# Patient Record
Sex: MALE | Race: WHITE | NOT HISPANIC OR LATINO
[De-identification: names, ages, dates, MRNs, and addresses within clinical notes are randomized per-mention and may not be internally consistent; named-entity substitution may affect disease eponyms.]

---

## 2017-01-13 ENCOUNTER — APPOINTMENT (OUTPATIENT)
Dept: CARDIOLOGY | Facility: CLINIC | Age: 82
End: 2017-01-13

## 2017-01-13 ENCOUNTER — NON-APPOINTMENT (OUTPATIENT)
Age: 82
End: 2017-01-13

## 2017-01-13 VITALS
OXYGEN SATURATION: 100 % | SYSTOLIC BLOOD PRESSURE: 128 MMHG | DIASTOLIC BLOOD PRESSURE: 52 MMHG | BODY MASS INDEX: 21.86 KG/M2 | HEART RATE: 62 BPM | WEIGHT: 136 LBS | HEIGHT: 66 IN

## 2017-02-02 ENCOUNTER — RX RENEWAL (OUTPATIENT)
Age: 82
End: 2017-02-02

## 2017-03-03 ENCOUNTER — RX RENEWAL (OUTPATIENT)
Age: 82
End: 2017-03-03

## 2017-03-06 ENCOUNTER — RX RENEWAL (OUTPATIENT)
Age: 82
End: 2017-03-06

## 2017-04-17 ENCOUNTER — RX RENEWAL (OUTPATIENT)
Age: 82
End: 2017-04-17

## 2017-05-12 ENCOUNTER — NON-APPOINTMENT (OUTPATIENT)
Age: 82
End: 2017-05-12

## 2017-05-12 ENCOUNTER — APPOINTMENT (OUTPATIENT)
Dept: CARDIOLOGY | Facility: CLINIC | Age: 82
End: 2017-05-12

## 2017-05-12 VITALS
HEIGHT: 66 IN | DIASTOLIC BLOOD PRESSURE: 56 MMHG | HEART RATE: 54 BPM | BODY MASS INDEX: 21.86 KG/M2 | WEIGHT: 136 LBS | OXYGEN SATURATION: 99 % | SYSTOLIC BLOOD PRESSURE: 158 MMHG

## 2017-05-12 VITALS — SYSTOLIC BLOOD PRESSURE: 140 MMHG | DIASTOLIC BLOOD PRESSURE: 60 MMHG

## 2017-08-04 ENCOUNTER — MEDICATION RENEWAL (OUTPATIENT)
Age: 82
End: 2017-08-04

## 2017-08-04 ENCOUNTER — RX RENEWAL (OUTPATIENT)
Age: 82
End: 2017-08-04

## 2017-09-05 ENCOUNTER — RX RENEWAL (OUTPATIENT)
Age: 82
End: 2017-09-05

## 2017-09-06 ENCOUNTER — MEDICATION RENEWAL (OUTPATIENT)
Age: 82
End: 2017-09-06

## 2017-10-12 ENCOUNTER — MEDICATION RENEWAL (OUTPATIENT)
Age: 82
End: 2017-10-12

## 2017-10-13 ENCOUNTER — APPOINTMENT (OUTPATIENT)
Dept: CARDIOLOGY | Facility: CLINIC | Age: 82
End: 2017-10-13
Payer: MEDICARE

## 2017-10-13 ENCOUNTER — NON-APPOINTMENT (OUTPATIENT)
Age: 82
End: 2017-10-13

## 2017-10-13 VITALS — DIASTOLIC BLOOD PRESSURE: 60 MMHG | SYSTOLIC BLOOD PRESSURE: 160 MMHG

## 2017-10-13 VITALS — SYSTOLIC BLOOD PRESSURE: 167 MMHG | HEART RATE: 51 BPM | OXYGEN SATURATION: 98 % | DIASTOLIC BLOOD PRESSURE: 60 MMHG

## 2017-10-13 VITALS — WEIGHT: 133 LBS | HEIGHT: 66 IN | BODY MASS INDEX: 21.38 KG/M2

## 2017-10-13 PROCEDURE — 93000 ELECTROCARDIOGRAM COMPLETE: CPT

## 2017-10-13 PROCEDURE — 99214 OFFICE O/P EST MOD 30 MIN: CPT | Mod: 25

## 2017-10-31 ENCOUNTER — APPOINTMENT (OUTPATIENT)
Dept: CARDIOLOGY | Facility: CLINIC | Age: 82
End: 2017-10-31
Payer: MEDICARE

## 2017-10-31 PROCEDURE — 93306 TTE W/DOPPLER COMPLETE: CPT

## 2017-11-10 ENCOUNTER — APPOINTMENT (OUTPATIENT)
Dept: CARDIOLOGY | Facility: CLINIC | Age: 82
End: 2017-11-10
Payer: MEDICARE

## 2017-11-10 VITALS
WEIGHT: 136 LBS | BODY MASS INDEX: 21.86 KG/M2 | OXYGEN SATURATION: 99 % | SYSTOLIC BLOOD PRESSURE: 100 MMHG | HEIGHT: 66 IN | DIASTOLIC BLOOD PRESSURE: 59 MMHG | HEART RATE: 60 BPM

## 2017-11-10 PROCEDURE — 99214 OFFICE O/P EST MOD 30 MIN: CPT

## 2017-11-10 PROCEDURE — 36415 COLL VENOUS BLD VENIPUNCTURE: CPT

## 2017-11-13 LAB
ALBUMIN SERPL ELPH-MCNC: 3.8 G/DL
ALP BLD-CCNC: 66 U/L
ALT SERPL-CCNC: 10 U/L
ANION GAP SERPL CALC-SCNC: 12 MMOL/L
AST SERPL-CCNC: 27 U/L
BILIRUB SERPL-MCNC: 0.4 MG/DL
BUN SERPL-MCNC: 28 MG/DL
CALCIUM SERPL-MCNC: 9.3 MG/DL
CHLORIDE SERPL-SCNC: 101 MMOL/L
CHOLEST SERPL-MCNC: 115 MG/DL
CHOLEST/HDLC SERPL: 2.6 RATIO
CO2 SERPL-SCNC: 27 MMOL/L
CREAT SERPL-MCNC: 1.24 MG/DL
GLUCOSE SERPL-MCNC: 187 MG/DL
HDLC SERPL-MCNC: 45 MG/DL
LDLC SERPL CALC-MCNC: 57 MG/DL
POTASSIUM SERPL-SCNC: 5.1 MMOL/L
PROT SERPL-MCNC: 6.8 G/DL
SODIUM SERPL-SCNC: 140 MMOL/L
TRIGL SERPL-MCNC: 63 MG/DL

## 2018-01-29 ENCOUNTER — RX RENEWAL (OUTPATIENT)
Age: 83
End: 2018-01-29

## 2018-03-05 ENCOUNTER — RX RENEWAL (OUTPATIENT)
Age: 83
End: 2018-03-05

## 2018-03-05 ENCOUNTER — MEDICATION RENEWAL (OUTPATIENT)
Age: 83
End: 2018-03-05

## 2018-03-23 ENCOUNTER — NON-APPOINTMENT (OUTPATIENT)
Age: 83
End: 2018-03-23

## 2018-03-23 ENCOUNTER — APPOINTMENT (OUTPATIENT)
Dept: CARDIOLOGY | Facility: CLINIC | Age: 83
End: 2018-03-23
Payer: MEDICARE

## 2018-03-23 VITALS — WEIGHT: 138 LBS | BODY MASS INDEX: 22.18 KG/M2 | HEIGHT: 66 IN

## 2018-03-23 VITALS — SYSTOLIC BLOOD PRESSURE: 137 MMHG | DIASTOLIC BLOOD PRESSURE: 51 MMHG | HEART RATE: 43 BPM | OXYGEN SATURATION: 98 %

## 2018-03-23 DIAGNOSIS — Z00.00 ENCOUNTER FOR GENERAL ADULT MEDICAL EXAMINATION W/OUT ABNORMAL FINDINGS: ICD-10-CM

## 2018-03-23 PROCEDURE — 93000 ELECTROCARDIOGRAM COMPLETE: CPT

## 2018-03-23 PROCEDURE — 99214 OFFICE O/P EST MOD 30 MIN: CPT | Mod: 25

## 2018-04-06 ENCOUNTER — RX RENEWAL (OUTPATIENT)
Age: 83
End: 2018-04-06

## 2018-07-03 ENCOUNTER — RX RENEWAL (OUTPATIENT)
Age: 83
End: 2018-07-03

## 2018-07-20 ENCOUNTER — APPOINTMENT (OUTPATIENT)
Dept: CARDIOLOGY | Facility: CLINIC | Age: 83
End: 2018-07-20
Payer: MEDICARE

## 2018-07-20 ENCOUNTER — NON-APPOINTMENT (OUTPATIENT)
Age: 83
End: 2018-07-20

## 2018-07-20 VITALS — BODY MASS INDEX: 21.21 KG/M2 | HEIGHT: 66 IN | WEIGHT: 132 LBS

## 2018-07-20 VITALS — SYSTOLIC BLOOD PRESSURE: 95 MMHG | HEART RATE: 48 BPM | DIASTOLIC BLOOD PRESSURE: 39 MMHG | OXYGEN SATURATION: 96 %

## 2018-07-20 PROCEDURE — 93000 ELECTROCARDIOGRAM COMPLETE: CPT

## 2018-07-20 PROCEDURE — 99214 OFFICE O/P EST MOD 30 MIN: CPT | Mod: 25

## 2018-07-27 ENCOUNTER — MEDICATION RENEWAL (OUTPATIENT)
Age: 83
End: 2018-07-27

## 2018-07-27 ENCOUNTER — RX RENEWAL (OUTPATIENT)
Age: 83
End: 2018-07-27

## 2018-09-04 ENCOUNTER — RX RENEWAL (OUTPATIENT)
Age: 83
End: 2018-09-04

## 2018-09-14 ENCOUNTER — RX RENEWAL (OUTPATIENT)
Age: 83
End: 2018-09-14

## 2018-10-01 ENCOUNTER — RX RENEWAL (OUTPATIENT)
Age: 83
End: 2018-10-01

## 2018-10-01 ENCOUNTER — MEDICATION RENEWAL (OUTPATIENT)
Age: 83
End: 2018-10-01

## 2018-11-02 ENCOUNTER — APPOINTMENT (OUTPATIENT)
Dept: CARDIOLOGY | Facility: CLINIC | Age: 83
End: 2018-11-02
Payer: MEDICARE

## 2018-11-02 ENCOUNTER — NON-APPOINTMENT (OUTPATIENT)
Age: 83
End: 2018-11-02

## 2018-11-02 VITALS
HEART RATE: 56 BPM | DIASTOLIC BLOOD PRESSURE: 63 MMHG | SYSTOLIC BLOOD PRESSURE: 151 MMHG | HEIGHT: 66 IN | WEIGHT: 137 LBS | RESPIRATION RATE: 18 BRPM | OXYGEN SATURATION: 98 % | BODY MASS INDEX: 22.02 KG/M2

## 2018-11-02 PROCEDURE — 93000 ELECTROCARDIOGRAM COMPLETE: CPT

## 2018-11-02 PROCEDURE — 99214 OFFICE O/P EST MOD 30 MIN: CPT | Mod: 25

## 2018-11-02 NOTE — DISCUSSION/SUMMARY
[Coronary Artery Disease] : coronary artery disease [Hyperlipidemia] : hyperlipidemia [Diet Modification] : diet modification [Exercise] : exercise [Hypertension] : hypertension [Peripheral Vascular Disease] : peripheral vascular disease [Stable] : stable [None] : none

## 2018-11-02 NOTE — REVIEW OF SYSTEMS
[Shortness Of Breath] : no shortness of breath [Abdominal Pain] : no abdominal pain [Nausea] : no nausea [Vomiting] : no vomiting [Heartburn] : no heartburn [Change in Appetite] : no change in appetite [Change In The Stool] : no change in stool [Dysphagia] : no dysphagia [Urinary Frequency] : no change in urinary frequency [see HPI] : see HPI [Joint Pain] : joint pain [Easy Bleeding] : no tendency for easy bleeding [Easy Bruising] : no tendency for easy bruising [Negative] : Endocrine

## 2018-11-02 NOTE — HISTORY OF PRESENT ILLNESS
[FreeTextEntry1] : Patient with CAD.PAD with prior LAD,Ramus  PCI  9/09, LBBB,moderate aortic stenosis came for follow up .Patient denies any symptoms , denies any shortness of breath  or dizziness ,denies any claudication \par \par  patient had blood work with his primary was told to be ok  \par  his bp is elevated as he was not compliant to diet  , denies any dizziness  \par \par Patient  denies any exertional chest pain or claudication.   Patient had carotid doppler showed 50 to79% bilateral stenosis  stable since 2014 ,  his echo showing  moderate aortic stenosis\par \par Lower extremity Doppler showed significant  left anterior tibial , right tibioperoneal stenosis , however patient denies any claudication or foot pain .\par \par his echo showed moderate AS , moderate to severe MR  which is worse than before \par  \par

## 2018-11-02 NOTE — PHYSICAL EXAM
[General Appearance - Well Developed] : well developed [General Appearance - Well Nourished] : well nourished [Normal Conjunctiva] : the conjunctiva exhibited no abnormalities [Normal Oral Mucosa] : normal oral mucosa [] : no respiratory distress [Respiration, Rhythm And Depth] : normal respiratory rhythm and effort [Auscultation Breath Sounds / Voice Sounds] : lungs were clear to auscultation bilaterally [Chest Palpation] : palpation of the chest revealed no abnormalities [Lungs Percussion] : the lungs were normal to percussion [Bowel Sounds] : normal bowel sounds [Abdomen Tenderness] : non-tender [Abdomen Mass (___ Cm)] : no abdominal mass palpated [Abnormal Walk] : normal gait [Nail Clubbing] : no clubbing of the fingernails [Cyanosis, Localized] : no localized cyanosis [FreeTextEntry1] : decreased  pulses in DP,PT  [Skin Color & Pigmentation] : normal skin color and pigmentation [Oriented To Time, Place, And Person] : oriented to person, place, and time [Normal Appearance] : was normal in appearance [Neck Supple] : was supple [Normal] : normal [Normal Rate] : normal [Rhythm Regular] : regular [Normal S1] : normal S1 [Normal S2] : normal S2 [No Gallop] : no gallop heard [III] : a grade 3 [2+] : left 2+ [0] : left 0 [Bruit] : a bruit was heard [No Pitting Edema] : no pitting edema present [Rt] : no varicose veins of the right leg [Lt] : no varicose veins of the left leg

## 2018-11-02 NOTE — CARDIOLOGY SUMMARY
[Fixed Defect] : fixed defect [___] : [unfilled] [LVEF ___%] : LVEF [unfilled]% [Moderate] : moderate mitral regurgitation

## 2018-11-02 NOTE — REASON FOR VISIT
[Follow-Up - Clinic] : a clinic follow-up of [Abnormal ECG] : an abnormal ECG [Coronary Artery Disease] : coronary artery disease [Hyperlipidemia] : hyperlipidemia [Hypertension] : hypertension [Medication Management] : Medication management [Peripheral Vascular Disease] : peripheral vascular disease [FreeTextEntry1] : aortic stenosis

## 2019-01-02 ENCOUNTER — EMERGENCY (EMERGENCY)
Facility: HOSPITAL | Age: 84
LOS: 1 days | Discharge: HOME | End: 2019-01-02
Attending: EMERGENCY MEDICINE | Admitting: EMERGENCY MEDICINE
Payer: MEDICARE

## 2019-01-02 VITALS
SYSTOLIC BLOOD PRESSURE: 145 MMHG | HEART RATE: 61 BPM | HEIGHT: 66 IN | RESPIRATION RATE: 18 BRPM | WEIGHT: 139.99 LBS | TEMPERATURE: 97 F | DIASTOLIC BLOOD PRESSURE: 71 MMHG

## 2019-01-02 LAB
ALBUMIN SERPL ELPH-MCNC: 3.7 G/DL — SIGNIFICANT CHANGE UP (ref 3.3–5)
ALP SERPL-CCNC: 80 U/L — SIGNIFICANT CHANGE UP (ref 40–120)
ALT FLD-CCNC: 31 U/L — SIGNIFICANT CHANGE UP (ref 12–78)
ANION GAP SERPL CALC-SCNC: 6 MMOL/L — SIGNIFICANT CHANGE UP (ref 5–17)
APTT BLD: 30.3 SEC — SIGNIFICANT CHANGE UP (ref 28.5–37)
AST SERPL-CCNC: 31 U/L — SIGNIFICANT CHANGE UP (ref 15–37)
BASOPHILS # BLD AUTO: 0.05 K/UL — SIGNIFICANT CHANGE UP (ref 0–0.2)
BASOPHILS NFR BLD AUTO: 0.5 % — SIGNIFICANT CHANGE UP (ref 0–2)
BILIRUB SERPL-MCNC: 0.3 MG/DL — SIGNIFICANT CHANGE UP (ref 0.2–1.2)
BUN SERPL-MCNC: 35 MG/DL — HIGH (ref 7–23)
CALCIUM SERPL-MCNC: 8.7 MG/DL — SIGNIFICANT CHANGE UP (ref 8.5–10.1)
CHLORIDE SERPL-SCNC: 106 MMOL/L — SIGNIFICANT CHANGE UP (ref 96–108)
CO2 SERPL-SCNC: 28 MMOL/L — SIGNIFICANT CHANGE UP (ref 22–31)
CREAT SERPL-MCNC: 1.2 MG/DL — SIGNIFICANT CHANGE UP (ref 0.5–1.3)
EOSINOPHIL # BLD AUTO: 0.2 K/UL — SIGNIFICANT CHANGE UP (ref 0–0.5)
EOSINOPHIL NFR BLD AUTO: 2 % — SIGNIFICANT CHANGE UP (ref 0–6)
GLUCOSE SERPL-MCNC: 96 MG/DL — SIGNIFICANT CHANGE UP (ref 70–99)
HCT VFR BLD CALC: 37.2 % — LOW (ref 39–50)
HGB BLD-MCNC: 12.2 G/DL — LOW (ref 13–17)
IMM GRANULOCYTES NFR BLD AUTO: 0.5 % — SIGNIFICANT CHANGE UP (ref 0–1.5)
INR BLD: 1.04 RATIO — SIGNIFICANT CHANGE UP (ref 0.88–1.16)
LYMPHOCYTES # BLD AUTO: 1.96 K/UL — SIGNIFICANT CHANGE UP (ref 1–3.3)
LYMPHOCYTES # BLD AUTO: 19.6 % — SIGNIFICANT CHANGE UP (ref 13–44)
MCHC RBC-ENTMCNC: 32 PG — SIGNIFICANT CHANGE UP (ref 27–34)
MCHC RBC-ENTMCNC: 32.8 GM/DL — SIGNIFICANT CHANGE UP (ref 32–36)
MCV RBC AUTO: 97.6 FL — SIGNIFICANT CHANGE UP (ref 80–100)
MONOCYTES # BLD AUTO: 1.19 K/UL — HIGH (ref 0–0.9)
MONOCYTES NFR BLD AUTO: 11.9 % — SIGNIFICANT CHANGE UP (ref 2–14)
NEUTROPHILS # BLD AUTO: 6.54 K/UL — SIGNIFICANT CHANGE UP (ref 1.8–7.4)
NEUTROPHILS NFR BLD AUTO: 65.5 % — SIGNIFICANT CHANGE UP (ref 43–77)
NRBC # BLD: 0 /100 WBCS — SIGNIFICANT CHANGE UP (ref 0–0)
PLATELET # BLD AUTO: 284 K/UL — SIGNIFICANT CHANGE UP (ref 150–400)
POTASSIUM SERPL-MCNC: 4.2 MMOL/L — SIGNIFICANT CHANGE UP (ref 3.5–5.3)
POTASSIUM SERPL-SCNC: 4.2 MMOL/L — SIGNIFICANT CHANGE UP (ref 3.5–5.3)
PROT SERPL-MCNC: 7.7 G/DL — SIGNIFICANT CHANGE UP (ref 6–8.3)
PROTHROM AB SERPL-ACNC: 11.9 SEC — SIGNIFICANT CHANGE UP (ref 10–12.9)
RBC # BLD: 3.81 M/UL — LOW (ref 4.2–5.8)
RBC # FLD: 11.7 % — SIGNIFICANT CHANGE UP (ref 10.3–14.5)
SODIUM SERPL-SCNC: 140 MMOL/L — SIGNIFICANT CHANGE UP (ref 135–145)
WBC # BLD: 9.99 K/UL — SIGNIFICANT CHANGE UP (ref 3.8–10.5)
WBC # FLD AUTO: 9.99 K/UL — SIGNIFICANT CHANGE UP (ref 3.8–10.5)

## 2019-01-02 PROCEDURE — 80053 COMPREHEN METABOLIC PANEL: CPT

## 2019-01-02 PROCEDURE — 36415 COLL VENOUS BLD VENIPUNCTURE: CPT

## 2019-01-02 PROCEDURE — 70450 CT HEAD/BRAIN W/O DYE: CPT | Mod: 26

## 2019-01-02 PROCEDURE — 85610 PROTHROMBIN TIME: CPT

## 2019-01-02 PROCEDURE — 85730 THROMBOPLASTIN TIME PARTIAL: CPT

## 2019-01-02 PROCEDURE — 85027 COMPLETE CBC AUTOMATED: CPT

## 2019-01-02 PROCEDURE — 99284 EMERGENCY DEPT VISIT MOD MDM: CPT | Mod: 25

## 2019-01-02 PROCEDURE — 70450 CT HEAD/BRAIN W/O DYE: CPT

## 2019-01-02 PROCEDURE — 99284 EMERGENCY DEPT VISIT MOD MDM: CPT

## 2019-01-02 RX ORDER — SIMVASTATIN 20 MG/1
0 TABLET, FILM COATED ORAL
Qty: 0 | Refills: 0 | COMMUNITY

## 2019-01-02 RX ORDER — RAMIPRIL 5 MG
0 CAPSULE ORAL
Qty: 0 | Refills: 0 | COMMUNITY

## 2019-01-02 RX ORDER — LEVOTHYROXINE SODIUM 125 MCG
1 TABLET ORAL
Qty: 0 | Refills: 0 | COMMUNITY

## 2019-01-02 RX ORDER — METOPROLOL TARTRATE 50 MG
1 TABLET ORAL
Qty: 0 | Refills: 0 | COMMUNITY

## 2019-01-02 RX ORDER — CLOPIDOGREL BISULFATE 75 MG/1
1 TABLET, FILM COATED ORAL
Qty: 0 | Refills: 0 | COMMUNITY

## 2019-01-02 NOTE — ED ADULT NURSE NOTE - CHPI ED NUR SYMPTOMS NEG
no weakness/no dizziness/no blurred vision/no loss of consciousness/no vomiting/no fever/no nausea/no numbness

## 2019-01-02 NOTE — ED ADULT NURSE NOTE - OBJECTIVE STATEMENT
received pt in bed #14a Pt Alert to name,  & month. Pt w/ hx  of dementia & as per wife pt always confused. As per police wife called because pt went for a walk & she could not get him to come back to the house.

## 2019-01-02 NOTE — ED PROVIDER NOTE - MEDICAL DECISION MAKING DETAILS
pt bib ems for ams, has dementia, left house, [place] [facility] [place]

## 2019-01-02 NOTE — ED PROVIDER NOTE - OBJECTIVE STATEMENT
pt bib ems for change in mental status. pt left house, said he was going to walk to [facility] [place]. per wife, pt usually listens when he asks him to come back inside, but didn't today. no fever, chills, ha, d/n/v, cp, sob, cough, abd pain, diarrhea, trauma, or any other complaints.  pmd - jimmy

## 2019-01-02 NOTE — ED ADULT NURSE NOTE - NSIMPLEMENTINTERV_GEN_ALL_ED
Implemented All Universal Safety Interventions:  [place] to call system. Call bell, personal items and telephone within reach. Instruct patient to call for assistance. Room bathroom lighting operational. Non-slip footwear when patient is off stretcher. Physically safe environment: no spills, clutter or unnecessary equipment. Stretcher in lowest position, wheels locked, appropriate side rails in place.

## 2019-01-02 NOTE — ED PROVIDER NOTE - PROGRESS NOTE DETAILS
Reevaluated patient at bedside.  Patient feeling well, acting at baseline per wife, they are requesting to be d/c home to f/u as outpt.  Discussed the results of all diagnostic testing in ED and copies of all reports given.   An opportunity to ask questions was given.  Discussed the importance of prompt, close medical follow-up.  Patient will return with any changes, concerns or persistent / worsening symptoms.  Understanding of all instructions verbalized.

## 2019-01-22 ENCOUNTER — RX RENEWAL (OUTPATIENT)
Age: 84
End: 2019-01-22

## 2019-02-21 ENCOUNTER — APPOINTMENT (OUTPATIENT)
Dept: CARDIOLOGY | Facility: CLINIC | Age: 84
End: 2019-02-21
Payer: MEDICARE

## 2019-02-21 ENCOUNTER — RX RENEWAL (OUTPATIENT)
Age: 84
End: 2019-02-21

## 2019-02-21 ENCOUNTER — NON-APPOINTMENT (OUTPATIENT)
Age: 84
End: 2019-02-21

## 2019-02-21 VITALS
DIASTOLIC BLOOD PRESSURE: 67 MMHG | WEIGHT: 144 LBS | SYSTOLIC BLOOD PRESSURE: 158 MMHG | OXYGEN SATURATION: 99 % | HEIGHT: 66 IN | HEART RATE: 49 BPM | BODY MASS INDEX: 23.14 KG/M2

## 2019-02-21 VITALS — SYSTOLIC BLOOD PRESSURE: 150 MMHG | DIASTOLIC BLOOD PRESSURE: 68 MMHG

## 2019-02-21 PROBLEM — F03.90 UNSPECIFIED DEMENTIA WITHOUT BEHAVIORAL DISTURBANCE: Chronic | Status: ACTIVE | Noted: 2019-01-02

## 2019-02-21 PROBLEM — I10 ESSENTIAL (PRIMARY) HYPERTENSION: Chronic | Status: ACTIVE | Noted: 2019-01-02

## 2019-02-21 PROCEDURE — 99214 OFFICE O/P EST MOD 30 MIN: CPT | Mod: 25

## 2019-02-21 PROCEDURE — 93000 ELECTROCARDIOGRAM COMPLETE: CPT

## 2019-02-21 NOTE — HISTORY OF PRESENT ILLNESS
[FreeTextEntry1] : Patient with CAD.PAD with prior LAD,Ramus  PCI  9/09, LBBB,moderate aortic stenosis came for follow up .Patient denies any symptoms , denies any shortness of breath  or dizziness ,denies any claudication \par \par  patient had blood work  showing mild anemia  normal LDL  \par  his bp is elevated as he was not compliant to diet  , denies any dizziness  \par \par Patient  denies any exertional chest pain or claudication.   Patient had carotid doppler showed 50 to79% bilateral stenosis  stable since 2014 ,  his echo showing  moderate aortic stenosis\par \par Lower extremity Doppler showed significant  left anterior tibial , right tibioperoneal stenosis , however patient denies any claudication or foot pain .\par \par his echo showed moderate AS , moderate to severe MR  which is worse than before \par  \par

## 2019-02-21 NOTE — REVIEW OF SYSTEMS
[see HPI] : see HPI [Joint Pain] : joint pain [Negative] : Endocrine [Shortness Of Breath] : no shortness of breath [Abdominal Pain] : no abdominal pain [Nausea] : no nausea [Vomiting] : no vomiting [Heartburn] : no heartburn [Change in Appetite] : no change in appetite [Change In The Stool] : no change in stool [Dysphagia] : no dysphagia [Urinary Frequency] : no change in urinary frequency [Easy Bleeding] : no tendency for easy bleeding [Easy Bruising] : no tendency for easy bruising

## 2019-02-21 NOTE — PHYSICAL EXAM
[General Appearance - Well Developed] : well developed [General Appearance - Well Nourished] : well nourished [Normal Conjunctiva] : the conjunctiva exhibited no abnormalities [Normal Oral Mucosa] : normal oral mucosa [] : no respiratory distress [Respiration, Rhythm And Depth] : normal respiratory rhythm and effort [Auscultation Breath Sounds / Voice Sounds] : lungs were clear to auscultation bilaterally [Chest Palpation] : palpation of the chest revealed no abnormalities [Lungs Percussion] : the lungs were normal to percussion [Bowel Sounds] : normal bowel sounds [Abdomen Tenderness] : non-tender [Abdomen Mass (___ Cm)] : no abdominal mass palpated [Abnormal Walk] : normal gait [Nail Clubbing] : no clubbing of the fingernails [Cyanosis, Localized] : no localized cyanosis [Skin Color & Pigmentation] : normal skin color and pigmentation [Oriented To Time, Place, And Person] : oriented to person, place, and time [Normal Appearance] : was normal in appearance [Neck Supple] : was supple [Normal] : normal [Normal Rate] : normal [Rhythm Regular] : regular [Normal S1] : normal S1 [Normal S2] : normal S2 [No Gallop] : no gallop heard [III] : a grade 3 [Right Carotid Bruit] : right carotid bruit heard [Left Carotid Bruit] : left carotid bruit heard [2+] : left 2+ [0] : left 0 [Bruit] : a bruit was heard [No Pitting Edema] : no pitting edema present [FreeTextEntry1] : decreased  pulses in DP,PT  [Rt] : no varicose veins of the right leg [Lt] : no varicose veins of the left leg

## 2019-03-13 ENCOUNTER — RX RENEWAL (OUTPATIENT)
Age: 84
End: 2019-03-13

## 2019-04-01 ENCOUNTER — RX RENEWAL (OUTPATIENT)
Age: 84
End: 2019-04-01

## 2019-04-01 ENCOUNTER — MEDICATION RENEWAL (OUTPATIENT)
Age: 84
End: 2019-04-01

## 2019-06-13 ENCOUNTER — NON-APPOINTMENT (OUTPATIENT)
Age: 84
End: 2019-06-13

## 2019-06-13 ENCOUNTER — APPOINTMENT (OUTPATIENT)
Dept: CARDIOLOGY | Facility: CLINIC | Age: 84
End: 2019-06-13
Payer: MEDICARE

## 2019-06-13 VITALS
HEART RATE: 55 BPM | HEIGHT: 66 IN | DIASTOLIC BLOOD PRESSURE: 61 MMHG | WEIGHT: 141 LBS | BODY MASS INDEX: 22.66 KG/M2 | SYSTOLIC BLOOD PRESSURE: 154 MMHG | OXYGEN SATURATION: 99 %

## 2019-06-13 VITALS — DIASTOLIC BLOOD PRESSURE: 60 MMHG | SYSTOLIC BLOOD PRESSURE: 124 MMHG

## 2019-06-13 DIAGNOSIS — I34.0 NONRHEUMATIC MITRAL (VALVE) INSUFFICIENCY: ICD-10-CM

## 2019-06-13 PROCEDURE — 93000 ELECTROCARDIOGRAM COMPLETE: CPT

## 2019-06-13 PROCEDURE — 99214 OFFICE O/P EST MOD 30 MIN: CPT | Mod: 25

## 2019-06-13 RX ORDER — ADHESIVE TAPE 3"X 2.3 YD
50 MCG TAPE, NON-MEDICATED TOPICAL
Qty: 90 | Refills: 0 | Status: ACTIVE | COMMUNITY

## 2019-06-13 NOTE — REASON FOR VISIT
[Follow-Up - Clinic] : a clinic follow-up of [Abnormal ECG] : an abnormal ECG [Coronary Artery Disease] : coronary artery disease [Hyperlipidemia] : hyperlipidemia [Hypertension] : hypertension [Medication Management] : Medication management [FreeTextEntry1] : aortic stenosis [Peripheral Vascular Disease] : peripheral vascular disease

## 2019-06-13 NOTE — PHYSICAL EXAM
[General Appearance - Well Developed] : well developed [General Appearance - Well Nourished] : well nourished [Normal Conjunctiva] : the conjunctiva exhibited no abnormalities [] : no respiratory distress [Normal Oral Mucosa] : normal oral mucosa [Respiration, Rhythm And Depth] : normal respiratory rhythm and effort [Chest Palpation] : palpation of the chest revealed no abnormalities [Auscultation Breath Sounds / Voice Sounds] : lungs were clear to auscultation bilaterally [Lungs Percussion] : the lungs were normal to percussion [Bowel Sounds] : normal bowel sounds [Abdomen Mass (___ Cm)] : no abdominal mass palpated [Abdomen Tenderness] : non-tender [Nail Clubbing] : no clubbing of the fingernails [Abnormal Walk] : normal gait [Skin Color & Pigmentation] : normal skin color and pigmentation [FreeTextEntry1] : decreased  pulses in DP,PT  [Cyanosis, Localized] : no localized cyanosis [Oriented To Time, Place, And Person] : oriented to person, place, and time [Normal Appearance] : was normal in appearance [Neck Supple] : was supple [Normal] : normal [Normal Rate] : normal [Rhythm Regular] : regular [Normal S1] : normal S1 [Normal S2] : normal S2 [III] : a grade 3 [No Gallop] : no gallop heard [Right Carotid Bruit] : right carotid bruit heard [Left Carotid Bruit] : left carotid bruit heard [2+] : left 2+ [0] : right 0 [Bruit] : a bruit was heard [No Pitting Edema] : no pitting edema present [Rt] : no varicose veins of the right leg [Lt] : no varicose veins of the left leg

## 2019-06-13 NOTE — HISTORY OF PRESENT ILLNESS
[FreeTextEntry1] : Patient with CAD.PAD with prior LAD,Ramus  PCI  9/09, LBBB,moderate aortic stenosis came for follow up .Patient denies any symptoms , denies any shortness of breath  or dizziness ,denies any claudication \par \par  patient had blood work  showing mild anemia  normal LDL   done in feb 2019 \par  his bp is elevated as he was not compliant to diet  , denies any dizziness  \par \par Patient  denies any exertional chest pain or claudication.   Patient had carotid doppler showed 50 to79% bilateral stenosis  stable since 2014 ,  his echo showing  moderate aortic stenosis\par \par Lower extremity Doppler showed significant  left anterior tibial , right tibioperoneal stenosis , however patient denies any claudication or foot pain .\par \par his echo showed moderate AS , moderate to severe MR  which is worse than before \par  \par

## 2019-06-13 NOTE — REVIEW OF SYSTEMS
[Shortness Of Breath] : no shortness of breath [Vomiting] : no vomiting [Abdominal Pain] : no abdominal pain [Nausea] : no nausea [Heartburn] : no heartburn [Change in Appetite] : no change in appetite [Change In The Stool] : no change in stool [Dysphagia] : no dysphagia [Urinary Frequency] : no change in urinary frequency [see HPI] : see HPI [Easy Bruising] : no tendency for easy bruising [Easy Bleeding] : no tendency for easy bleeding [Joint Pain] : joint pain [Negative] : Endocrine

## 2019-07-01 ENCOUNTER — RX RENEWAL (OUTPATIENT)
Age: 84
End: 2019-07-01

## 2019-07-22 ENCOUNTER — RX RENEWAL (OUTPATIENT)
Age: 84
End: 2019-07-22

## 2019-07-23 ENCOUNTER — MEDICATION RENEWAL (OUTPATIENT)
Age: 84
End: 2019-07-23

## 2019-08-14 ENCOUNTER — APPOINTMENT (OUTPATIENT)
Dept: CARDIOLOGY | Facility: CLINIC | Age: 84
End: 2019-08-14
Payer: MEDICARE

## 2019-08-14 PROCEDURE — 93306 TTE W/DOPPLER COMPLETE: CPT

## 2019-08-21 ENCOUNTER — RX RENEWAL (OUTPATIENT)
Age: 84
End: 2019-08-21

## 2019-08-21 ENCOUNTER — MEDICATION RENEWAL (OUTPATIENT)
Age: 84
End: 2019-08-21

## 2019-10-17 ENCOUNTER — APPOINTMENT (OUTPATIENT)
Dept: CARDIOLOGY | Facility: CLINIC | Age: 84
End: 2019-10-17
Payer: MEDICARE

## 2019-10-17 ENCOUNTER — NON-APPOINTMENT (OUTPATIENT)
Age: 84
End: 2019-10-17

## 2019-10-17 VITALS
OXYGEN SATURATION: 100 % | SYSTOLIC BLOOD PRESSURE: 138 MMHG | BODY MASS INDEX: 22.02 KG/M2 | DIASTOLIC BLOOD PRESSURE: 57 MMHG | HEIGHT: 66 IN | HEART RATE: 57 BPM | WEIGHT: 137 LBS

## 2019-10-17 PROCEDURE — 99214 OFFICE O/P EST MOD 30 MIN: CPT

## 2019-10-17 PROCEDURE — 93000 ELECTROCARDIOGRAM COMPLETE: CPT

## 2019-10-17 RX ORDER — RAMIPRIL 5 MG/1
5 CAPSULE ORAL
Qty: 90 | Refills: 0 | Status: ACTIVE | COMMUNITY

## 2019-10-17 RX ORDER — MEMANTINE HYDROCHLORIDE 5 MG/1
5 TABLET ORAL TWICE DAILY
Refills: 0 | Status: ACTIVE | COMMUNITY

## 2019-10-17 NOTE — REVIEW OF SYSTEMS
[Shortness Of Breath] : no shortness of breath [Nausea] : no nausea [Abdominal Pain] : no abdominal pain [Vomiting] : no vomiting [Change In The Stool] : no change in stool [Change in Appetite] : no change in appetite [Heartburn] : no heartburn [Urinary Frequency] : no change in urinary frequency [see HPI] : see HPI [Dysphagia] : no dysphagia [Easy Bleeding] : no tendency for easy bleeding [Joint Pain] : joint pain [Easy Bruising] : no tendency for easy bruising [Negative] : Psychiatric

## 2019-10-17 NOTE — DISCUSSION/SUMMARY
[Coronary Artery Disease] : coronary artery disease [Hyperlipidemia] : hyperlipidemia [Hypertension] : hypertension [Diet Modification] : diet modification [Exercise] : exercise [Peripheral Vascular Disease] : peripheral vascular disease [Stable] : stable [None] : none

## 2019-10-17 NOTE — HISTORY OF PRESENT ILLNESS
[FreeTextEntry1] : Patient with CAD.PAD with prior LAD,Ramus  PCI  9/09, LBBB,moderate aortic stenosis came for follow up .Patient denies any symptoms , denies any shortness of breath  or dizziness ,denies any claudication \par Patient had echo showing severe AS mild to moderate AI , mild MS ,mild MR  normal EF\par \par  patient had blood work  showing mild anemia  normal LDL   done in feb 2019 \par  his bp is elevated as he was not compliant to diet  , denies any dizziness  \par \par Patient  denies any exertional chest pain or claudication.   Patient had carotid doppler showed 50 to79% bilateral stenosis  stable since 2014 ,\par \par Lower extremity Doppler showed significant  left anterior tibial , right tibioperoneal stenosis , however patient denies any claudication or foot pain .\par \par \par  \par

## 2019-10-17 NOTE — CARDIOLOGY SUMMARY
[Fixed Defect] : fixed defect [LVEF ___%] : LVEF [unfilled]% [___] : [unfilled] [Moderate] : moderate mitral regurgitation

## 2019-10-17 NOTE — PHYSICAL EXAM
[General Appearance - Well Nourished] : well nourished [General Appearance - Well Developed] : well developed [Normal Oral Mucosa] : normal oral mucosa [Normal Conjunctiva] : the conjunctiva exhibited no abnormalities [] : no respiratory distress [Respiration, Rhythm And Depth] : normal respiratory rhythm and effort [Chest Palpation] : palpation of the chest revealed no abnormalities [Auscultation Breath Sounds / Voice Sounds] : lungs were clear to auscultation bilaterally [Abdomen Mass (___ Cm)] : no abdominal mass palpated [Lungs Percussion] : the lungs were normal to percussion [Abdomen Tenderness] : non-tender [Bowel Sounds] : normal bowel sounds [Cyanosis, Localized] : no localized cyanosis [Nail Clubbing] : no clubbing of the fingernails [Abnormal Walk] : normal gait [Oriented To Time, Place, And Person] : oriented to person, place, and time [FreeTextEntry1] : decreased  pulses in DP,PT  [Skin Color & Pigmentation] : normal skin color and pigmentation [Normal Appearance] : was normal in appearance [Neck Supple] : was supple [Normal Rate] : normal [Normal] : normal [Rhythm Regular] : regular [Normal S2] : normal S2 [Normal S1] : normal S1 [No Gallop] : no gallop heard [S2 Single] : was not split [III] : a grade 3 [Right Carotid Bruit] : right carotid bruit heard [Left Carotid Bruit] : left carotid bruit heard [2+] : left 2+ [Bruit] : a bruit was heard [0] : left 0 [Rt] : no varicose veins of the right leg [No Pitting Edema] : no pitting edema present [Lt] : no varicose veins of the left leg

## 2019-10-17 NOTE — REASON FOR VISIT
[Abnormal ECG] : an abnormal ECG [Follow-Up - Clinic] : a clinic follow-up of [Coronary Artery Disease] : coronary artery disease [Hypertension] : hypertension [Hyperlipidemia] : hyperlipidemia [Medication Management] : Medication management [Peripheral Vascular Disease] : peripheral vascular disease [FreeTextEntry1] : aortic stenosis

## 2020-01-24 ENCOUNTER — APPOINTMENT (OUTPATIENT)
Dept: CARDIOLOGY | Facility: CLINIC | Age: 85
End: 2020-01-24
Payer: MEDICARE

## 2020-01-24 ENCOUNTER — NON-APPOINTMENT (OUTPATIENT)
Age: 85
End: 2020-01-24

## 2020-01-24 VITALS
BODY MASS INDEX: 21.69 KG/M2 | SYSTOLIC BLOOD PRESSURE: 116 MMHG | DIASTOLIC BLOOD PRESSURE: 45 MMHG | WEIGHT: 135 LBS | HEIGHT: 66 IN | OXYGEN SATURATION: 96 % | HEART RATE: 60 BPM

## 2020-01-24 DIAGNOSIS — R07.89 OTHER CHEST PAIN: ICD-10-CM

## 2020-01-24 DIAGNOSIS — I44.7 LEFT BUNDLE-BRANCH BLOCK, UNSPECIFIED: ICD-10-CM

## 2020-01-24 PROCEDURE — 99214 OFFICE O/P EST MOD 30 MIN: CPT

## 2020-01-24 PROCEDURE — 93000 ELECTROCARDIOGRAM COMPLETE: CPT

## 2020-01-24 RX ORDER — NITROGLYCERIN 0.4 MG/1
0.4 TABLET SUBLINGUAL
Qty: 30 | Refills: 0 | Status: ACTIVE | COMMUNITY
Start: 2020-01-24 | End: 1900-01-01

## 2020-01-24 NOTE — REASON FOR VISIT
[Follow-Up - Clinic] : a clinic follow-up of [Abnormal ECG] : an abnormal ECG [Hyperlipidemia] : hyperlipidemia [Hypertension] : hypertension [Coronary Artery Disease] : coronary artery disease [Peripheral Vascular Disease] : peripheral vascular disease [Medication Management] : Medication management [FreeTextEntry1] : aortic stenosis

## 2020-01-24 NOTE — HISTORY OF PRESENT ILLNESS
[FreeTextEntry1] : Patient with CAD.PAD with prior LAD,Ramus  PCI  9/09, LBBB,moderate aortic stenosis came for follow up after hospital with chest pain which was non specified    patient blood work showed  negative troponin , echo showed normal LVEF moderate to severe AS ,  patient is forgetfulness , has diagnosed to have dementia , \par \par denies any shortness of breath  or dizziness ,denies any claudication \par \par Patient does take much water \par \par  his bp is elevated as he was not compliant to diet  , denies any dizziness  \par \par Patient  denies any exertional chest pain or claudication.   Patient had carotid doppler showed 50 to79% bilateral stenosis  stable since 2014 ,\par \par Lower extremity Doppler showed significant  left anterior tibial , right tibioperoneal stenosis , however patient denies any claudication or foot pain .\par \par \par  \par

## 2020-01-24 NOTE — REVIEW OF SYSTEMS
[Abdominal Pain] : no abdominal pain [Shortness Of Breath] : no shortness of breath [Vomiting] : no vomiting [Nausea] : no nausea [Heartburn] : no heartburn [Change In The Stool] : no change in stool [Change in Appetite] : no change in appetite [Dysphagia] : no dysphagia [see HPI] : see HPI [Urinary Frequency] : no change in urinary frequency [Joint Pain] : joint pain [Easy Bleeding] : no tendency for easy bleeding [Easy Bruising] : no tendency for easy bruising [Negative] : Endocrine

## 2020-01-24 NOTE — DISCUSSION/SUMMARY
[Coronary Artery Disease] : coronary artery disease [Hyperlipidemia] : hyperlipidemia [Diet Modification] : diet modification [Hypertension] : hypertension [Exercise] : exercise [Peripheral Vascular Disease] : peripheral vascular disease [None] : none [Stable] : stable

## 2020-01-24 NOTE — PHYSICAL EXAM
[General Appearance - Well Nourished] : well nourished [General Appearance - Well Developed] : well developed [Normal Oral Mucosa] : normal oral mucosa [Normal Conjunctiva] : the conjunctiva exhibited no abnormalities [] : no respiratory distress [Respiration, Rhythm And Depth] : normal respiratory rhythm and effort [Auscultation Breath Sounds / Voice Sounds] : lungs were clear to auscultation bilaterally [Chest Palpation] : palpation of the chest revealed no abnormalities [Lungs Percussion] : the lungs were normal to percussion [Abdomen Tenderness] : non-tender [Bowel Sounds] : normal bowel sounds [Abdomen Mass (___ Cm)] : no abdominal mass palpated [Nail Clubbing] : no clubbing of the fingernails [Cyanosis, Localized] : no localized cyanosis [Abnormal Walk] : normal gait [Skin Color & Pigmentation] : normal skin color and pigmentation [FreeTextEntry1] : decreased  pulses in DP,PT  [Neck Supple] : was supple [Normal Appearance] : was normal in appearance [Oriented To Time, Place, And Person] : oriented to person, place, and time [Normal] : normal [Normal Rate] : normal [Rhythm Regular] : regular [Normal S2] : normal S2 [Normal S1] : normal S1 [S2 Single] : was not split [No Gallop] : no gallop heard [III] : a grade 3 [Right Carotid Bruit] : right carotid bruit heard [Left Carotid Bruit] : left carotid bruit heard [2+] : left 2+ [0] : right 0 [No Pitting Edema] : no pitting edema present [Bruit] : a bruit was heard [Rt] : no varicose veins of the right leg [Lt] : no varicose veins of the left leg

## 2020-02-17 ENCOUNTER — RX CHANGE (OUTPATIENT)
Age: 85
End: 2020-02-17

## 2020-02-28 ENCOUNTER — APPOINTMENT (OUTPATIENT)
Dept: CARDIOLOGY | Facility: CLINIC | Age: 85
End: 2020-02-28
Payer: MEDICARE

## 2020-02-28 ENCOUNTER — NON-APPOINTMENT (OUTPATIENT)
Age: 85
End: 2020-02-28

## 2020-02-28 VITALS
SYSTOLIC BLOOD PRESSURE: 143 MMHG | WEIGHT: 135 LBS | DIASTOLIC BLOOD PRESSURE: 56 MMHG | BODY MASS INDEX: 21.69 KG/M2 | OXYGEN SATURATION: 98 % | HEART RATE: 61 BPM | HEIGHT: 66 IN

## 2020-02-28 VITALS — DIASTOLIC BLOOD PRESSURE: 56 MMHG | SYSTOLIC BLOOD PRESSURE: 130 MMHG

## 2020-02-28 DIAGNOSIS — I25.10 ATHEROSCLEROTIC HEART DISEASE OF NATIVE CORONARY ARTERY W/OUT ANGINA PECTORIS: ICD-10-CM

## 2020-02-28 DIAGNOSIS — E78.5 HYPERLIPIDEMIA, UNSPECIFIED: ICD-10-CM

## 2020-02-28 DIAGNOSIS — I73.9 PERIPHERAL VASCULAR DISEASE, UNSPECIFIED: ICD-10-CM

## 2020-02-28 DIAGNOSIS — I35.0 NONRHEUMATIC AORTIC (VALVE) STENOSIS: ICD-10-CM

## 2020-02-28 DIAGNOSIS — I10 ESSENTIAL (PRIMARY) HYPERTENSION: ICD-10-CM

## 2020-02-28 PROCEDURE — 93000 ELECTROCARDIOGRAM COMPLETE: CPT

## 2020-02-28 PROCEDURE — 99214 OFFICE O/P EST MOD 30 MIN: CPT

## 2020-02-28 RX ORDER — QUETIAPINE 25 MG/1
25 TABLET, FILM COATED ORAL
Refills: 0 | Status: ACTIVE | COMMUNITY

## 2020-02-28 NOTE — REASON FOR VISIT
[Follow-Up - Clinic] : a clinic follow-up of [Abnormal ECG] : an abnormal ECG [Coronary Artery Disease] : coronary artery disease [Hyperlipidemia] : hyperlipidemia [Hypertension] : hypertension [Peripheral Vascular Disease] : peripheral vascular disease [Medication Management] : Medication management [FreeTextEntry1] : aortic stenosis

## 2020-02-28 NOTE — DISCUSSION/SUMMARY
[Coronary Artery Disease] : coronary artery disease [Hyperlipidemia] : hyperlipidemia [Diet Modification] : diet modification [Exercise] : exercise [Hypertension] : hypertension [Peripheral Vascular Disease] : peripheral vascular disease [None] : none [Stable] : stable

## 2020-02-28 NOTE — HISTORY OF PRESENT ILLNESS
[FreeTextEntry1] : Patient with CAD.PAD with prior LAD,Ramus  PCI  9/09, LBBB,moderate aortic stenosis came for follow up  says he is doing fine , patient family accompanied him , Patient denies any chest pain or shortness of breath on routine walking ,  \par \par denies any shortness of breath  or dizziness ,denies any claudication \par \par Patient does take much water \par \par  His blood pressure is controlled   , denies any dizziness  \par \par Patient  denies any exertional chest pain or claudication.   Patient had carotid doppler showed 50 to79% bilateral stenosis  stable since 2014 ,\par \par Lower extremity Doppler showed significant  left anterior tibial , right tibioperoneal stenosis , however patient denies any claudication or foot pain .\par \par \par  \par

## 2020-02-28 NOTE — REVIEW OF SYSTEMS
[Shortness Of Breath] : no shortness of breath [Abdominal Pain] : no abdominal pain [Nausea] : no nausea [Vomiting] : no vomiting [Heartburn] : no heartburn [Change in Appetite] : no change in appetite [Change In The Stool] : no change in stool [Dysphagia] : no dysphagia [see HPI] : see HPI [Urinary Frequency] : no change in urinary frequency [Joint Pain] : joint pain [Easy Bruising] : no tendency for easy bruising [Easy Bleeding] : no tendency for easy bleeding [Negative] : Endocrine

## 2020-02-28 NOTE — PHYSICAL EXAM
[General Appearance - Well Developed] : well developed [Normal Conjunctiva] : the conjunctiva exhibited no abnormalities [General Appearance - Well Nourished] : well nourished [] : no respiratory distress [Normal Oral Mucosa] : normal oral mucosa [Auscultation Breath Sounds / Voice Sounds] : lungs were clear to auscultation bilaterally [Respiration, Rhythm And Depth] : normal respiratory rhythm and effort [Chest Palpation] : palpation of the chest revealed no abnormalities [Lungs Percussion] : the lungs were normal to percussion [Bowel Sounds] : normal bowel sounds [Abdomen Tenderness] : non-tender [Abdomen Mass (___ Cm)] : no abdominal mass palpated [Abnormal Walk] : normal gait [Nail Clubbing] : no clubbing of the fingernails [Cyanosis, Localized] : no localized cyanosis [FreeTextEntry1] : decreased  pulses in DP,PT  [Skin Color & Pigmentation] : normal skin color and pigmentation [Oriented To Time, Place, And Person] : oriented to person, place, and time [Normal Appearance] : was normal in appearance [Neck Supple] : was supple [Normal] : normal [Rhythm Regular] : regular [Normal Rate] : normal [Normal S1] : normal S1 [Normal S2] : normal S2 [S2 Single] : was not split [No Gallop] : no gallop heard [III] : a grade 3 [Right Carotid Bruit] : right carotid bruit heard [Left Carotid Bruit] : left carotid bruit heard [2+] : left 2+ [0] : left 0 [Bruit] : a bruit was heard [No Pitting Edema] : no pitting edema present [Rt] : no varicose veins of the right leg [Lt] : no varicose veins of the left leg

## 2020-03-02 ENCOUNTER — EMERGENCY (EMERGENCY)
Facility: HOSPITAL | Age: 85
LOS: 1 days | Discharge: HOME | End: 2020-03-02
Attending: EMERGENCY MEDICINE | Admitting: EMERGENCY MEDICINE
Payer: MEDICARE

## 2020-03-02 VITALS
RESPIRATION RATE: 16 BRPM | OXYGEN SATURATION: 97 % | TEMPERATURE: 95 F | DIASTOLIC BLOOD PRESSURE: 48 MMHG | SYSTOLIC BLOOD PRESSURE: 100 MMHG | HEART RATE: 68 BPM

## 2020-03-02 VITALS
SYSTOLIC BLOOD PRESSURE: 114 MMHG | RESPIRATION RATE: 17 BRPM | HEART RATE: 71 BPM | OXYGEN SATURATION: 98 % | DIASTOLIC BLOOD PRESSURE: 55 MMHG | TEMPERATURE: 100 F

## 2020-03-02 LAB
ALBUMIN SERPL ELPH-MCNC: 3.2 G/DL — LOW (ref 3.3–5)
ALP SERPL-CCNC: 81 U/L — SIGNIFICANT CHANGE UP (ref 40–120)
ALT FLD-CCNC: 24 U/L — SIGNIFICANT CHANGE UP (ref 12–78)
ANION GAP SERPL CALC-SCNC: 9 MMOL/L — SIGNIFICANT CHANGE UP (ref 5–17)
AST SERPL-CCNC: 27 U/L — SIGNIFICANT CHANGE UP (ref 15–37)
BASOPHILS # BLD AUTO: 0.03 K/UL — SIGNIFICANT CHANGE UP (ref 0–0.2)
BASOPHILS NFR BLD AUTO: 0.3 % — SIGNIFICANT CHANGE UP (ref 0–2)
BILIRUB SERPL-MCNC: 0.3 MG/DL — SIGNIFICANT CHANGE UP (ref 0.2–1.2)
BUN SERPL-MCNC: 32 MG/DL — HIGH (ref 7–23)
CALCIUM SERPL-MCNC: 8.9 MG/DL — SIGNIFICANT CHANGE UP (ref 8.5–10.1)
CHLORIDE SERPL-SCNC: 107 MMOL/L — SIGNIFICANT CHANGE UP (ref 96–108)
CO2 SERPL-SCNC: 25 MMOL/L — SIGNIFICANT CHANGE UP (ref 22–31)
CREAT SERPL-MCNC: 1.3 MG/DL — SIGNIFICANT CHANGE UP (ref 0.5–1.3)
EOSINOPHIL # BLD AUTO: 0.14 K/UL — SIGNIFICANT CHANGE UP (ref 0–0.5)
EOSINOPHIL NFR BLD AUTO: 1.5 % — SIGNIFICANT CHANGE UP (ref 0–6)
GLUCOSE SERPL-MCNC: 203 MG/DL — HIGH (ref 70–99)
HCT VFR BLD CALC: 34.2 % — LOW (ref 39–50)
HGB BLD-MCNC: 11.3 G/DL — LOW (ref 13–17)
IMM GRANULOCYTES NFR BLD AUTO: 0.7 % — SIGNIFICANT CHANGE UP (ref 0–1.5)
LYMPHOCYTES # BLD AUTO: 1.62 K/UL — SIGNIFICANT CHANGE UP (ref 1–3.3)
LYMPHOCYTES # BLD AUTO: 17.8 % — SIGNIFICANT CHANGE UP (ref 13–44)
MCHC RBC-ENTMCNC: 32.1 PG — SIGNIFICANT CHANGE UP (ref 27–34)
MCHC RBC-ENTMCNC: 33 GM/DL — SIGNIFICANT CHANGE UP (ref 32–36)
MCV RBC AUTO: 97.2 FL — SIGNIFICANT CHANGE UP (ref 80–100)
MONOCYTES # BLD AUTO: 0.86 K/UL — SIGNIFICANT CHANGE UP (ref 0–0.9)
MONOCYTES NFR BLD AUTO: 9.5 % — SIGNIFICANT CHANGE UP (ref 2–14)
NEUTROPHILS # BLD AUTO: 6.38 K/UL — SIGNIFICANT CHANGE UP (ref 1.8–7.4)
NEUTROPHILS NFR BLD AUTO: 70.2 % — SIGNIFICANT CHANGE UP (ref 43–77)
NRBC # BLD: 0 /100 WBCS — SIGNIFICANT CHANGE UP (ref 0–0)
PLATELET # BLD AUTO: 245 K/UL — SIGNIFICANT CHANGE UP (ref 150–400)
POTASSIUM SERPL-MCNC: 4.6 MMOL/L — SIGNIFICANT CHANGE UP (ref 3.5–5.3)
POTASSIUM SERPL-SCNC: 4.6 MMOL/L — SIGNIFICANT CHANGE UP (ref 3.5–5.3)
PROT SERPL-MCNC: 6.9 G/DL — SIGNIFICANT CHANGE UP (ref 6–8.3)
RBC # BLD: 3.52 M/UL — LOW (ref 4.2–5.8)
RBC # FLD: 11.9 % — SIGNIFICANT CHANGE UP (ref 10.3–14.5)
SODIUM SERPL-SCNC: 141 MMOL/L — SIGNIFICANT CHANGE UP (ref 135–145)
WBC # BLD: 9.09 K/UL — SIGNIFICANT CHANGE UP (ref 3.8–10.5)
WBC # FLD AUTO: 9.09 K/UL — SIGNIFICANT CHANGE UP (ref 3.8–10.5)

## 2020-03-02 PROCEDURE — 96360 HYDRATION IV INFUSION INIT: CPT

## 2020-03-02 PROCEDURE — 80053 COMPREHEN METABOLIC PANEL: CPT

## 2020-03-02 PROCEDURE — 93005 ELECTROCARDIOGRAM TRACING: CPT

## 2020-03-02 PROCEDURE — 93010 ELECTROCARDIOGRAM REPORT: CPT

## 2020-03-02 PROCEDURE — 85027 COMPLETE CBC AUTOMATED: CPT

## 2020-03-02 PROCEDURE — 36415 COLL VENOUS BLD VENIPUNCTURE: CPT

## 2020-03-02 PROCEDURE — 99285 EMERGENCY DEPT VISIT HI MDM: CPT

## 2020-03-02 PROCEDURE — 99284 EMERGENCY DEPT VISIT MOD MDM: CPT | Mod: 25

## 2020-03-02 RX ORDER — SODIUM CHLORIDE 9 MG/ML
1000 INJECTION INTRAMUSCULAR; INTRAVENOUS; SUBCUTANEOUS ONCE
Refills: 0 | Status: COMPLETED | OUTPATIENT
Start: 2020-03-02 | End: 2020-03-02

## 2020-03-02 RX ADMIN — SODIUM CHLORIDE 1000 MILLILITER(S): 9 INJECTION INTRAMUSCULAR; INTRAVENOUS; SUBCUTANEOUS at 05:23

## 2020-03-02 RX ADMIN — SODIUM CHLORIDE 1000 MILLILITER(S): 9 INJECTION INTRAMUSCULAR; INTRAVENOUS; SUBCUTANEOUS at 06:30

## 2020-03-02 NOTE — ED ADULT NURSE REASSESSMENT NOTE - NS ED NURSE REASSESS COMMENT FT1
Harini hugger still in place, repeat rectal temperature performed, ED MD aware.  Patient boosted up on stretcher and currently in position of comfort with side rails up and safety maintained.  Wife still at the bedside.
Patient's wife offered  for additional help at home, however, wife states they have an aid that comes Monday through Friday 9am-3pm and also on the weekend.  Their two kids are also involved in their parents' care and she said she will be speaking to them regarding patient care.
Received pt in bed alert, confused.  Pt with wife who is care taker.  Pt vs wnl.  Rectal temp to be reassessed.  Pt has warming blanket on.  Social work consult pending.  Ongoing nursing care and safety maintained.

## 2020-03-02 NOTE — ED PROVIDER NOTE - CLINICAL SUMMARY MEDICAL DECISION MAKING FREE TEXT BOX
hypothermia, check labs, warmed IV fluids, bridger hugger mild hypothermia, check labs, warmed IV fluids, bridger hugger

## 2020-03-02 NOTE — ED PROVIDER NOTE - PROGRESS NOTE DETAILS
rectal temp 94.7 spoke with Vicky (daughter) who states that she is aware of what happened, she will be making arrangements for placement for father and she is comfortable with discharge and responsibility for future care and safety.

## 2020-03-02 NOTE — ED ADULT NURSE NOTE - NSIMPLEMENTINTERV_GEN_ALL_ED
Implemented All Fall with Harm Risk Interventions:  [place] to call system. Call bell, personal items and telephone within reach. Instruct patient to call for assistance. Room bathroom lighting operational. Non-slip footwear when patient is off stretcher. Physically safe environment: no spills, clutter or unnecessary equipment. Stretcher in lowest position, wheels locked, appropriate side rails in place. Provide visual cue, wrist band, yellow gown, etc. Monitor gait and stability. Monitor for mental status changes and reorient to person, place, and time. Review medications for side effects contributing to fall risk. Reinforce activity limits and safety measures with patient and family. Provide visual clues: red socks.

## 2020-03-02 NOTE — ED PROVIDER NOTE - OBJECTIVE STATEMENT
89 yo male hx of dementia, htn, BIBEMS here with wife, was found outside house in neighbor's car, wife noticed patient was not home around 3am.  Patient unable to provide further history secondary to dementia.

## 2020-03-02 NOTE — ED ADULT NURSE REASSESSMENT NOTE - NSIMPLEMENTINTERV_GEN_ALL_ED
Implemented All Fall with Harm Risk Interventions:  [place] to call system. Call bell, personal items and telephone within reach. Instruct patient to call for assistance. Room bathroom lighting operational. Non-slip footwear when patient is off stretcher. Physically safe environment: no spills, clutter or unnecessary equipment. Stretcher in lowest position, wheels locked, appropriate side rails in place. Provide visual cue, wrist band, yellow gown, etc. Monitor gait and stability. Monitor for mental status changes and reorient to person, place, and time. Review medications for side effects contributing to fall risk. Reinforce activity limits and safety measures with patient and family. Provide visual clues: red socks.

## 2020-03-02 NOTE — ED PROVIDER NOTE - CARE PLAN
Principal Discharge DX:	Hypothermia due to cold environment Principal Discharge DX:	Hypothermia due to cold environment  Secondary Diagnosis:	Dementia

## 2020-03-02 NOTE — ED PROVIDER NOTE - NSFOLLOWUPINSTRUCTIONS_ED_ALL_ED_FT
-- Follow up with your primary care physician in 48 hours.    -- Return to the ER for worsening or persistent symptoms, and/or ANY NEW OR CONCERNING SYMPTOMS.    -- If you have difficulty following up, please call: [phone] to obtain a [facility] doctor or specialist who takes your insurance in your area.

## 2020-03-02 NOTE — ED ADULT NURSE NOTE - OBJECTIVE STATEMENT
Patient with history of Alzheimer's brought in by ambulance from home with c/o possible hypothermia.  Patient's wife noticed him missing from the house around 0315; patient found with no pants and an undershirt in the running car of the next door neighbor.  Patient arrives awake and alert, speaking clearly, denies pain or feeling cold.  Patient arrives with 20 G to LA with IV fluids infusing.  Patient is cool to touch; warm IV fluids now infusing with bridger hugger in place.  Wife is at the bedside.

## 2020-03-02 NOTE — ED PROVIDER NOTE - PHYSICAL EXAMINATION
Gen: demented but pleasant  Head/eyes: NC/AT, PERRL, EOMI, normal lids/conjunctiva, no scleral icterus  ENT: airway patent  Neck: supple, no tenderness/meningismus/JVD, Trachea midline  Pulm/lung: Bilateral clear BS, normal resp effort, no wheeze/stridor/retractions  CV/heart: RRR, no M/R/G, +2 dist pulses (radial, pedal DP/PT, popliteal)  GI/Abd: soft, NT/ND, +BS, no guarding/rebound tenderness  Musculoskeletal: no edema/erythema/cyanosis, FROM in all extremities, no C/T/L spine ttp  Skin: no rash, no vesicles, no petechaie, no ecchymosis, no swelling, skin cool to touch, no frostbite  Neuro: demented, CN 2-12 intact, normal sensation, 5/5 motor strength in all extremities

## 2020-03-02 NOTE — ED PROVIDER NOTE - PATIENT PORTAL LINK FT
You can access the FollowMyHealth Patient Portal offered by [facility] by registering at the following website: [email or web address]. By joining [employer]’s FollowMyHealth portal, you will also be able to view your health information using other applications (apps) compatible with our system.

## 2020-05-28 ENCOUNTER — APPOINTMENT (OUTPATIENT)
Dept: CARDIOLOGY | Facility: CLINIC | Age: 85
End: 2020-05-28

## 2022-11-04 NOTE — ED PROVIDER NOTE - ENMT, MLM
Airway patent. Mouth with normal mucosa Cheek Interpolation Flap Text: A decision was made to reconstruct the defect utilizing an interpolation axial flap and a staged reconstruction.  A telfa template was made of the defect.  This telfa template was then used to outline the Cheek Interpolation flap.  The donor area for the pedicle flap was then injected with anesthesia.  The flap was excised through the skin and subcutaneous tissue down to the layer of the underlying musculature.  The interpolation flap was carefully excised within this deep plane to maintain its blood supply.  The edges of the donor site were undermined.   The donor site was closed in a primary fashion.  The pedicle was then rotated into position and sutured.  Once the tube was sutured into place, adequate blood supply was confirmed with blanching and refill.  The pedicle was then wrapped with xeroform gauze and dressed appropriately with a telfa and gauze bandage to ensure continued blood supply and protect the attached pedicle.

## 2023-09-18 NOTE — ED ADULT NURSE NOTE - EXTENSIONS OF SELF_ADULT
None Low Dose Naltrexone Counseling- I discussed with the patient the potential risks and side effects of low dose naltrexone including but not limited to: more vivid dreams, headaches, nausea, vomiting, abdominal pain, fatigue, dizziness, and anxiety.

## 2024-12-14 NOTE — ED PROVIDER NOTE - EYES, MLM
Problem: Fluid Volume Excess  Goal: Fluid Volume Excess Symptoms Resolved  Description: Treatment often consists of oxygen and respiratory support with diuretic therapy at doses that exceed usual dose (typically doubled).  Monitor patient response to treatment.    Acute dyspnea should resolve quickly if dose is adequate and kidney function is adequate. Dyspnea/SOB should only be observed with Activity after effective treatment. Patient should be able to lie down comfortably, without SOB.  Outcome: Monitoring/Evaluating progress  Goal: Oxygenation is maintained (SpO2 greater than or equal to 90% or as ordered)  Outcome: Monitoring/Evaluating progress  Goal: Verbalizes understanding of FVE management plan  Description: Document on Patient Education Activity  Outcome: Monitoring/Evaluating progress     Problem: Postoperative Care  Goal: Vital signs are maintained within parameters  Outcome: Monitoring/Evaluating progress  Goal: Elimination status is maintained/returned to baseline  Outcome: Monitoring/Evaluating progress  Goal: Oral intake is resumed and tolerated  Outcome: Monitoring/Evaluating progress  Goal: Activity level is resumed to level needed for d/c  Outcome: Monitoring/Evaluating progress  Goal: Verbalizes understanding of postoperative care in the hospital and after d/c  Description: Document on Patient Education Activity  Outcome: Monitoring/Evaluating progress      Clear bilaterally, pupils equal, round and reactive to light.

## 2025-02-26 NOTE — ED ADULT NURSE NOTE - MUSCULOSKELETAL WDL
Providers


Date of admission: 


02/13/25 21:33





Expected date of discharge: 02/25/25


Attending physician: 


Sia Baez MD





Consults: 





                                        





02/15/25 07:59


Consult Physician Routine 


   Consulting Provider: Deon Donaldson


   Consult Reason/Comments: hypoxic respiratory failure


   Do you want consulting provider notified?: Yes











Primary care physician: 


[facility] Course: 





77-year-old male with past medical history significant for coronary artery 

disease, insulin-dependent diabetes mellitus, hypertension presents to the 

emergency department today due to altered mental status, hypoxia and complaints 

of vomiting, cough productive of green sputum, and myalgias. In the ED he 

underwent extensive evaluation. /75,  bpm, T 99.5 F, RR 20, O2 

saturation 86% on room air. WBCs 13.2, RBC 3.69, hemoglobin 11.7, hematocrit 

32.9, platelets 137, sodium 137, potassium 3.2, chloride 99, CO2 25, BUN 15, 

creatinine 0.9, total bilirubin 1.5, alkaline phosphatase 132, troponin x 2 

0.043 and 0.091, total protein 6.1; urinalysis 1+ protein, trace glucose. EKG 

sinus tachycardia with ventricular rate of 118 bpm, QTc 384 ms, left axis 

deviation, left ventricular hypertrophy. CXR and Brain CT negative for acute 

process. CTA chest showed no evidence of pulmonary embolism, diffuse patchy 

groundglass pulmonary opacities suggestive of atypical pulmonary infection, 

cholelithiasis, fluid identified within the visualized esophagus. Patient was 

started on IV hydration, Rocephin/Azithromycin and admitted for further workup 

and management. Pulmonary and Cardiology consulted. Completed 48H heparin 

infusion for type II NSTEMI, Echo showed EF of 55% with inferolateral wall 

hypokinesia, Cardiology recommended outpatient follow up for ischemic workup. 

Respiratory status worsened, started on AirVo and antibiotics switched to 

Vancomycin and Cefepime. MRSA nares negative, Vancomycin discontinued, continued

on Cefepime for a total of 10 days during his hospitalization. He also received 

Lasix 40 mg IV QD x 7 days. Eventually weaned off oxygen on 2/25.





2/25 Patient was seen and examined. Feeling well. Plans for SNF today. BMP Na 

133, K 3.4, Cl 91, bicarb 36, BUN 35, glu 197. Mag 2.4. Maintained on Cefepime 

and Lasix IV. 





Discharge Plan:


Continue DuoNeb PRN, Tylenol PRN. New prescriptions include Seroquel, Metorolol,

Lisinopril, ASA, Plavix, Ceftin x 4 days, Lasix x 7 days and Prednisone taper. 


Repeat BMP in 3 days to be followed up with PCP. 


Follow up with Cardiology and Pulmonary within 1 week of discharge.


Follow up with PCP within 1-2 days of discharge.


Follow up with Wound Care PRN.





General: non toxic, no distress, appears at stated age


Derm: warm, dry


Head: atraumatic, normocephalic, symmetric


Eyes: EOMI, no lid lag, anicteric sclera


Mouth: no lip lesion, mucus membranes moist


Cardiovascular: S1S2 reg, no murmur


Lungs: Decreased BS bilateral, no rhonchi, no rales , no accessory muscle use


Ext: no gross muscle atrophy, no edema, no contractures


Neuro:  no focal neuro deficits


Psych: Alert, oriented, appropriate affect 





Discharge Diagnosis:





Acute hypoxic respiratory failure and sepsis due to multifocal PNA and ARDS


NSTEMI


Prerenal azotemia with hypochloremic hyponatremia and metabolic alkalosis


Hypokalemia


DM with hyperglycemia


Right foot wound


Hypertension


History of CLL





This complex discharge took 50 minutes to complete and coordinate.





Patient Condition at Discharge: Stable





Plan - Discharge Summary


Discharge Rx Participant: No


New Discharge Prescriptions: 


New


   Artificial Tears-Hypromellose [Artificial Tear Drops] 2 drops BOTH EYES TID 

PRN  ml


     PRN Reason: Dry Eye(S)


   Acetaminophen Chew Tab [Children's Tylenol Chew Tab] 80 mg PO Q6HR PRN  tab


     PRN Reason: Fever And/ Or Pain


   Ipratropium-Albuterol Nebulize [Duoneb 0.5 mg-3 mg/3 ml Soln] 3 ml INHALATION

RT-Q4H PRN  each


     PRN Reason: shortness of breath


   QUEtiapine [SEROquel] 25 mg PO HS  tab


   Metoprolol Succinate (ER) [Toprol XL] 25 mg PO DAILY  tab


   lisinopriL [Zestril] 20 mg PO DAILY  tab


   Aspirin 81 mg PO DAILY  tab


   Clopidogrel [Plavix] 75 mg PO DAILY  tab


   predniSONE See Taper PO AS DIRECTED #30 tab


   Furosemide [Lasix] 40 mg PO DAILY #7 tablet





Continue


   Montelukast Sodium [Singulair] 10 mg PO DAILY


   Fluticasone Nasal Spray [Flonase Nasal Spray] 2 spr EA NOSTRIL DAILY


   Atorvastatin [Lipitor] 80 mg PO DAILY


   Baclofen 10 - 20 mg PO DAILY


   Sucralfate [Carafate] 1 gm PO ACHS


   Insulin NPH Hum/Reg Insulin Hm [NovoLIN 70-30 100 Unit/ml Vial] 25 unit SQ 

DAILY


   Omeprazole 40 mg PO DAILY


   Insulin NPH Hum/Reg Insulin Hm [NovoLIN 70-30 100 Unit/ml Vial] 15 unit SQ HS


   cefuroxime axetiL [Ceftin] 500 mg PO BID #8


   HYDROcodone/APAP 5-325MG [Norco 5-325] 1 tab PO Q6HR #12 tab


   ALPRAZolam [Xanax] 0.25 mg PO DAILY PRN #3 tab


     PRN Reason: Anxiety





Changed


   Meclizine [Antivert] 25 mg PO TID PRN #0


     PRN Reason: Vertigo





Discontinued


   lisinopriL [Zestril] 20 mg PO BID


Discharge Medication List





Montelukast Sodium [Singulair] 10 mg PO DAILY 08/18/17 [History]


Fluticasone Nasal Spray [Flonase Nasal Spray] 2 spr EA NOSTRIL DAILY 04/19/21 

[History]


Atorvastatin [Lipitor] 80 mg PO DAILY 06/01/21 [History]


Omeprazole 40 mg PO DAILY 10/20/21 [History]


Baclofen 10 - 20 mg PO DAILY 01/30/25 [History]


Sucralfate [Carafate] 1 gm PO ACHS 01/30/25 [History]


Insulin NPH Hum/Reg Insulin Hm [NovoLIN 70-30 100 Unit/ml Vial] 15 unit SQ HS 

02/13/25 [History]


Insulin NPH Hum/Reg Insulin Hm [NovoLIN 70-30 100 Unit/ml Vial] 25 unit SQ DAILY

02/13/25 [History]


ALPRAZolam [Xanax] 0.25 mg PO DAILY PRN #3 tab 02/25/25 [Rx]


Acetaminophen Chew Tab [Children's Tylenol Chew Tab] 80 mg PO Q6HR PRN  tab 

02/25/25 [Rx]


Artificial Tears-Hypromellose [Artificial Tear Drops] 2 drops BOTH EYES TID PRN 

ml 02/25/25 [Rx]


Aspirin 81 mg PO DAILY  tab 02/25/25 [Rx]


Clopidogrel [Plavix] 75 mg PO DAILY  tab 02/25/25 [Rx]


Furosemide [Lasix] 40 mg PO DAILY #7 tablet 02/25/25 [Rx]


HYDROcodone/APAP 5-325MG [Norco 5-325] 1 tab PO Q6HR #12 tab 02/25/25 [Rx]


Ipratropium-Albuterol Nebulize [Duoneb 0.5 mg-3 mg/3 ml Soln] 3 ml INHALATION 

RT-Q4H PRN  each 02/25/25 [Rx]


Meclizine [Antivert] 25 mg PO TID PRN #0 02/25/25 [Rx]


Metoprolol Succinate (ER) [Toprol XL] 25 mg PO DAILY  tab 02/25/25 [Rx]


QUEtiapine [SEROquel] 25 mg PO HS  tab 02/25/25 [Rx]


cefuroxime axetiL [Ceftin] 500 mg PO BID #8 02/25/25 [Rx]


lisinopriL [Zestril] 20 mg PO DAILY  tab 02/25/25 [Rx]


predniSONE See Taper PO AS DIRECTED #30 tab 02/25/25 [Rx]








Follow up Appointment(s)/Referral(s): 


Panfilo Diamond MD [STAFF PHYSICIAN] - 1 Week


[facility] [NON-STAFF] - As Needed


Smith Figueroa DO [Primary Care Provider] - 1-2 days


Romeo Armas MD [STAFF PHYSICIAN] - 1 Week


Activity/Diet/Wound Care/Special Instructions: 


Repeat BMP in 3 days to be followed up with PCP.


Discharge Disposition: TRANSFER TO SNF/ECF


Plan of Treatment: 


Recommend outpatient Lexiscan nuclear stress test
Providers


Date of admission: 


02/13/25 21:33





Expected date of discharge: 02/26/25


Attending physician: 


Sia Baez MD





Consults: 





                                        





02/15/25 07:59


Consult Physician Routine 


   Consulting Provider: Deon Donaldson


   Consult Reason/Comments: hypoxic respiratory failure


   Do you want consulting provider notified?: Yes











[facility] 


[facility]





[facility] Course: 





Discharge Diagnosis:


Acute hypoxic respiratory failure and sepsis due to multifocal PNA and ARDS


Acute NSTEMI


Prerenal azotemia with hypochloremic hyponatremia and metabolic alkalosis


Hypokalemia


DM with hyperglycemia


Right foot wound


Hypertension


History of CLL





Hospital Course: 


77-year-old male with past medical history significant for coronary artery 

disease, insulin-dependent diabetes mellitus, hypertension presents to the 

emergency department today due to altered mental status, hypoxia and complaints 

of vomiting, cough productive of green sputum, and myalgias. In the ED he 

underwent extensive evaluation. /75,  bpm, T 99.5 F, RR 20, O2 

saturation 86% on room air. WBCs 13.2, RBC 3.69, hemoglobin 11.7, hematocrit 

32.9, platelets 137, sodium 137, potassium 3.2, chloride 99, CO2 25, BUN 15, 

creatinine 0.9, total bilirubin 1.5, alkaline phosphatase 132, troponin x 2 

0.043 and 0.091, total protein 6.1; urinalysis 1+ protein, trace glucose. EKG 

sinus tachycardia with ventricular rate of 118 bpm, QTc 384 ms, left axis 

deviation, left ventricular hypertrophy. CXR and Brain CT negative for acute 

process. CTA chest showed no evidence of pulmonary embolism, diffuse patchy 

groundglass pulmonary opacities suggestive of atypical pulmonary infection, 

cholelithiasis, fluid identified within the visualized esophagus. Patient was 

started on IV hydration, Rocephin/Azithromycin and admitted for further workup 

and management. Pulmonary and Cardiology consulted. Completed 48H heparin 

infusion for type II NSTEMI, Echo showed EF of 55% with inferolateral wall 

hypokinesia, Cardiology recommended outpatient follow up for ischemic workup. 

Respiratory status worsened, started on AirVo and antibiotics switched to 

Vancomycin and Cefepime. MRSA nares negative, Vancomycin discontinued, continued

on Cefepime for a total of 10 days during his hospitalization. He also received 

Lasix 40 mg IV QD x 7 days. Eventually weaned off oxygen on 2/25. 


Continue DuoNeb PRN, Tylenol PRN. New prescriptions include Seroquel, Metorolol,

Lisinopril, ASA, Plavix, Lasix x 7 days and Prednisone taper. 


WBC still elevated, likely secondary to steroid.  Repeat CBC outpatient.





Repeat BMP in 3 days to be followed up with PCP. 


Follow up with Cardiology and Pulmonary within 1 week of discharge.


Follow up with PCP within 1-2 days of discharge.


Follow up with Wound Care PRN.








Patient seen and examined at bedside.





Vital signs reviewed and stable. 


General: non toxic, no distress, appears at stated age


Derm: warm, dry


Head: atraumatic, normocephalic, symmetric


Eyes: EOMI, no lid lag, anicteric sclera


Mouth: no lip lesion, mucus membranes moist


Cardiovascular: S1S2 reg, no murmur


Lungs: Decreased BS bilateral, no rhonchi, no rales , no accessory muscle use


Ext: no gross muscle atrophy, no edema, no contractures


Neuro:  no focal neuro deficits


Psych: Alert, oriented, appropriate affect








A total of 38 minutes of time were spent preparing this complex discharge 

summary.


Patient was discharged on 2/26/2025 at 1019. 


Patient Condition at Discharge: Stable





Plan - Discharge Summary


Discharge Rx Participant: No


New Discharge Prescriptions: 


New


   Artificial Tears-Hypromellose [Artificial Tear Drops] 2 drops BOTH EYES TID 

PRN  ml


     PRN Reason: Dry Eye(S)


   Acetaminophen Chew Tab [Children's Tylenol Chew Tab] 80 mg PO Q6HR PRN  tab


     PRN Reason: Fever And/ Or Pain


   Ipratropium-Albuterol Nebulize [Duoneb 0.5 mg-3 mg/3 ml Soln] 3 ml INHALATION

RT-Q4H PRN  each


     PRN Reason: shortness of breath


   QUEtiapine [SEROquel] 25 mg PO HS  tab


   Metoprolol Succinate (ER) [Toprol XL] 25 mg PO DAILY  tab


   lisinopriL [Zestril] 20 mg PO DAILY  tab


   Aspirin 81 mg PO DAILY  tab


   Clopidogrel [Plavix] 75 mg PO DAILY  tab


   predniSONE See Taper PO AS DIRECTED #30 tab


   Furosemide [Lasix] 40 mg PO DAILY #7 tablet





Continue


   Montelukast Sodium [Singulair] 10 mg PO DAILY


   Fluticasone Nasal Spray [Flonase Nasal Spray] 2 spr EA NOSTRIL DAILY


   Atorvastatin [Lipitor] 80 mg PO DAILY


   Baclofen 10 - 20 mg PO DAILY


   Sucralfate [Carafate] 1 gm PO ACHS


   Insulin NPH Hum/Reg Insulin Hm [NovoLIN 70-30 100 Unit/ml Vial] 25 unit SQ 

DAILY


   Omeprazole 40 mg PO DAILY


   Insulin NPH Hum/Reg Insulin Hm [NovoLIN 70-30 100 Unit/ml Vial] 15 unit SQ HS


   HYDROcodone/APAP 5-325MG [Norco 5-325] 1 tab PO Q6HR #12 tab


   ALPRAZolam [Xanax] 0.25 mg PO DAILY PRN #3 tab


     PRN Reason: Anxiety





Changed


   Meclizine [Antivert] 25 mg PO TID PRN #0


     PRN Reason: Vertigo





Discontinued


   lisinopriL [Zestril] 20 mg PO BID


   cefuroxime axetiL [Ceftin] 500 mg PO BID


Discharge Medication List





Montelukast Sodium [Singulair] 10 mg PO DAILY 08/18/17 [History]


Fluticasone Nasal Spray [Flonase Nasal Spray] 2 spr EA NOSTRIL DAILY 04/19/21 

[History]


Atorvastatin [Lipitor] 80 mg PO DAILY 06/01/21 [History]


Omeprazole 40 mg PO DAILY 10/20/21 [History]


Baclofen 10 - 20 mg PO DAILY 01/30/25 [History]


Sucralfate [Carafate] 1 gm PO ACHS 01/30/25 [History]


Insulin NPH Hum/Reg Insulin Hm [NovoLIN 70-30 100 Unit/ml Vial] 15 unit SQ HS 

02/13/25 [History]


Insulin NPH Hum/Reg Insulin Hm [NovoLIN 70-30 100 Unit/ml Vial] 25 unit SQ DAILY

02/13/25 [History]


ALPRAZolam [Xanax] 0.25 mg PO DAILY PRN #3 tab 02/25/25 [Rx]


Acetaminophen Chew Tab [Children's Tylenol Chew Tab] 80 mg PO Q6HR PRN  tab 

02/25/25 [Rx]


Artificial Tears-Hypromellose [Artificial Tear Drops] 2 drops BOTH EYES TID PRN 

ml 02/25/25 [Rx]


Aspirin 81 mg PO DAILY  tab 02/25/25 [Rx]


Clopidogrel [Plavix] 75 mg PO DAILY  tab 02/25/25 [Rx]


Furosemide [Lasix] 40 mg PO DAILY #7 tablet 02/25/25 [Rx]


HYDROcodone/APAP 5-325MG [Norco 5-325] 1 tab PO Q6HR #12 tab 02/25/25 [Rx]


Ipratropium-Albuterol Nebulize [Duoneb 0.5 mg-3 mg/3 ml Soln] 3 ml INHALATION 

RT-Q4H PRN  each 02/25/25 [Rx]


Meclizine [Antivert] 25 mg PO TID PRN #0 02/25/25 [Rx]


Metoprolol Succinate (ER) [Toprol XL] 25 mg PO DAILY  tab 02/25/25 [Rx]


QUEtiapine [SEROquel] 25 mg PO HS  tab 02/25/25 [Rx]


lisinopriL [Zestril] 20 mg PO DAILY  tab 02/25/25 [Rx]


predniSONE See Taper PO AS DIRECTED #30 tab 02/25/25 [Rx]








Follow up Appointment(s)/Referral(s): 


Panfilo Diamond MD [STAFF PHYSICIAN] - 1 Week


[facility] [NON-STAFF] - As Needed


Smith Figueroa DO [Primary Care Provider] - 1-2 days


Romeo Armas MD [STAFF PHYSICIAN] - 1 Week


Patient Instructions/Handouts:  Hypoxia (GEN)


Activity/Diet/Wound Care/Special Instructions: 


Repeat BMP in 3 days to be followed up with PCP.


Discharge Disposition: TRANSFER TO SNF/ECF


Plan of Treatment: 


Recommend outpatient Lexiscan nuclear stress test
Full range of motion of upper and lower extremities, no joint tenderness/swelling.